# Patient Record
Sex: MALE | Race: WHITE | NOT HISPANIC OR LATINO | Employment: OTHER | URBAN - NONMETROPOLITAN AREA
[De-identification: names, ages, dates, MRNs, and addresses within clinical notes are randomized per-mention and may not be internally consistent; named-entity substitution may affect disease eponyms.]

---

## 2023-07-25 ENCOUNTER — HOSPITAL ENCOUNTER (OUTPATIENT)
Dept: GENERAL RADIOLOGY | Facility: HOSPITAL | Age: 68
Discharge: HOME | End: 2023-07-25
Admitting: NEUROLOGICAL SURGERY
Payer: MEDICARE

## 2023-07-25 ENCOUNTER — OFFICE VISIT (OUTPATIENT)
Dept: NEUROSURGERY | Facility: CLINIC | Age: 68
End: 2023-07-25
Payer: MEDICARE

## 2023-07-25 VITALS — HEIGHT: 70 IN | WEIGHT: 270 LBS | BODY MASS INDEX: 38.65 KG/M2 | TEMPERATURE: 99.3 F

## 2023-07-25 DIAGNOSIS — M43.16 SPONDYLOLISTHESIS OF LUMBAR REGION: ICD-10-CM

## 2023-07-25 DIAGNOSIS — M54.16 LUMBAR RADICULOPATHY: Primary | ICD-10-CM

## 2023-07-25 DIAGNOSIS — M51.36 DDD (DEGENERATIVE DISC DISEASE), LUMBAR: ICD-10-CM

## 2023-07-25 PROCEDURE — 1159F MED LIST DOCD IN RCRD: CPT | Performed by: NEUROLOGICAL SURGERY

## 2023-07-25 PROCEDURE — 72120 X-RAY BEND ONLY L-S SPINE: CPT

## 2023-07-25 PROCEDURE — 99203 OFFICE O/P NEW LOW 30 MIN: CPT | Performed by: NEUROLOGICAL SURGERY

## 2023-07-25 PROCEDURE — 1160F RVW MEDS BY RX/DR IN RCRD: CPT | Performed by: NEUROLOGICAL SURGERY

## 2023-07-25 RX ORDER — ERGOCALCIFEROL 1.25 MG/1
50000 CAPSULE ORAL
COMMUNITY
Start: 2023-06-22

## 2023-07-25 RX ORDER — METOPROLOL SUCCINATE 50 MG/1
1 TABLET, EXTENDED RELEASE ORAL DAILY
COMMUNITY
Start: 2023-06-22

## 2023-07-25 RX ORDER — PREGABALIN 50 MG/1
50 CAPSULE ORAL 2 TIMES DAILY
COMMUNITY
Start: 2023-07-12

## 2023-07-25 RX ORDER — HYDROCODONE BITARTRATE AND ACETAMINOPHEN 7.5; 325 MG/1; MG/1
1 TABLET ORAL EVERY 6 HOURS
COMMUNITY
Start: 2023-06-13

## 2023-07-25 RX ORDER — FAMOTIDINE 20 MG/1
1 TABLET, FILM COATED ORAL EVERY 12 HOURS SCHEDULED
COMMUNITY
Start: 2023-07-05

## 2023-07-25 NOTE — PROGRESS NOTES
Patient: Jose Aguirre  : 1955    Primary Care Provider: Aron Ochoa MD    Requesting Provider: As above        History    Chief Complaint: Right leg pain with sensory alteration.    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  He describes a 2-year history of low back pain.  A couple of months ago he developed horrible low back pain extending into the right leg after unloading a small utility trailer.  For a while he was debilitated and could not bear weight.  He was using crutches.  Over the last couple of weeks symptoms have improved.  He has taken Lyrica and Norco.  Symptoms extend into an approximately L4 distribution of his right lower extremity where he also had some tingling and numbness.  Symptoms have been worse with standing, walking, lifting.  He has done better sitting and leaning to the left.    Review of Systems   Constitutional:  Positive for fatigue. Negative for activity change, appetite change, chills, diaphoresis, fever and unexpected weight change.   HENT:  Positive for hearing loss. Negative for congestion, dental problem, drooling, ear discharge, ear pain, facial swelling, mouth sores, nosebleeds, postnasal drip, rhinorrhea, sinus pressure, sinus pain, sneezing, sore throat, tinnitus, trouble swallowing and voice change.    Eyes:  Positive for pain and discharge. Negative for photophobia, redness, itching and visual disturbance.   Respiratory:  Positive for apnea and shortness of breath. Negative for cough, choking, chest tightness, wheezing and stridor.    Cardiovascular:  Negative for chest pain, palpitations and leg swelling.   Gastrointestinal:  Negative for abdominal distention, abdominal pain, anal bleeding, blood in stool, constipation, diarrhea, nausea, rectal pain and vomiting.   Endocrine: Negative for cold intolerance, heat intolerance, polydipsia, polyphagia and polyuria.   Genitourinary:   "Positive for frequency. Negative for decreased urine volume, difficulty urinating, dyspareunia, dysuria, enuresis, flank pain, genital sores, hematuria, menstrual problem, pelvic pain, urgency, vaginal bleeding, vaginal discharge and vaginal pain.   Musculoskeletal:  Positive for back pain. Negative for arthralgias, gait problem, joint swelling, myalgias, neck pain and neck stiffness.   Skin:  Negative for color change, pallor, rash and wound.   Allergic/Immunologic: Negative for environmental allergies, food allergies and immunocompromised state.   Neurological:  Positive for headaches. Negative for dizziness, tremors, seizures, syncope, facial asymmetry, speech difficulty, weakness, light-headedness and numbness.   Hematological:  Negative for adenopathy. Bruises/bleeds easily.   Psychiatric/Behavioral:  Positive for agitation. Negative for behavioral problems, confusion, decreased concentration, dysphoric mood, hallucinations, self-injury, sleep disturbance and suicidal ideas. The patient is not nervous/anxious and is not hyperactive.      The patient's past medical history, past surgical history, family history, and social history have been reviewed at length in the electronic medical record.      Physical Exam:   Temp 99.3 °F (37.4 °C)   Ht 177.8 cm (70\")   Wt 122 kg (270 lb)   BMI 38.74 kg/m²   CONSTITUTIONAL: Patient is well-nourished, pleasant and appears stated age.  MUSCULOSKELETAL:  Straight leg raising is negative.  Ugo's Sign is negative.  ROM in the low back is normal.  Tenderness in the back to palpation is not observed.  NEUROLOGICAL:  Orientation, memory, attention span, language function, and cognition have been examined and are intact.  Strength is intact in the lower extremities to direct testing.  Muscle tone is normal throughout.  Station and gait are normal.  Sensation is intact to light touch testing throughout.  Deep tendon reflexes are 2+ and symmetrical except the right knee reflex " which is trace.  Coordination is intact.      Medical Decision Making    Data Review:   (All imaging studies were personally reviewed unless stated otherwise)  Lumbar MRI study dated 6/13/2023 demonstrates some degenerative disc disease and facet arthropathy.  There is a grade 1 listhesis of L4 on L5.  Large joint effusion is noted on the right at L4-5.  There is foraminal narrowing with some possible facet overgrowth or possibly even some degree of disc bulging.  Some of this is more lateral within the foramen.    Diagnosis:   1.  Right L4 radiculopathy.  2.  L4-5 spondylolisthesis.    Treatment Options:   Fortunately, the patient is improving.  He will continue his Lyrica.  I have referred him to physical therapy.  Prior to follow-up with me in several weeks I am going to check flexion-extension upright lateral lumbar spine x-rays to assess for instability.       Diagnosis Plan   1. Lumbar radiculopathy  Ambulatory Referral to Physical Therapy Evaluate and treat; Stretching, ROM, Strengthening      2. Spondylolisthesis of lumbar region  XR spine lumbar flex and ext      3. DDD (degenerative disc disease), lumbar            Scribed for Sameer Mares MD by NOE Castellon 7/25/2023 12:07 EDT      I, Dr. Mares, personally performed the services described in the documentation, as scribed in my presence, and it is both accurate and complete.

## 2023-09-12 ENCOUNTER — OFFICE VISIT (OUTPATIENT)
Dept: NEUROSURGERY | Facility: CLINIC | Age: 68
End: 2023-09-12
Payer: MEDICARE

## 2023-09-12 VITALS — TEMPERATURE: 97.7 F | WEIGHT: 270 LBS | HEIGHT: 70 IN | BODY MASS INDEX: 38.65 KG/M2 | RESPIRATION RATE: 17 BRPM

## 2023-09-12 DIAGNOSIS — M54.16 LUMBAR RADICULOPATHY: Primary | ICD-10-CM

## 2023-09-12 DIAGNOSIS — M43.16 SPONDYLOLISTHESIS OF LUMBAR REGION: ICD-10-CM

## 2023-09-12 PROBLEM — N17.9 AKI (ACUTE KIDNEY INJURY): Status: ACTIVE | Noted: 2022-07-13

## 2023-09-12 PROBLEM — I10 ESSENTIAL HYPERTENSION: Status: ACTIVE | Noted: 2022-07-13

## 2023-09-12 PROBLEM — E78.5 HLD (HYPERLIPIDEMIA): Status: ACTIVE | Noted: 2022-07-13

## 2023-09-12 PROBLEM — K21.9 GERD (GASTROESOPHAGEAL REFLUX DISEASE): Status: ACTIVE | Noted: 2022-07-13

## 2023-09-12 PROCEDURE — 99213 OFFICE O/P EST LOW 20 MIN: CPT | Performed by: NEUROLOGICAL SURGERY

## 2023-09-12 PROCEDURE — 1160F RVW MEDS BY RX/DR IN RCRD: CPT | Performed by: NEUROLOGICAL SURGERY

## 2023-09-12 PROCEDURE — 1159F MED LIST DOCD IN RCRD: CPT | Performed by: NEUROLOGICAL SURGERY

## 2023-09-12 RX ORDER — PREGABALIN 50 MG/1
50 CAPSULE ORAL DAILY
Qty: 30 CAPSULE | Refills: 0 | Status: SHIPPED | OUTPATIENT
Start: 2023-09-12

## 2023-09-12 NOTE — PROGRESS NOTES
Patient: Jose Aguirre  : 1955    Primary Care Provider: Aron Ochoa MD    Requesting Provider: As above        History    Chief Complaint: Right leg pain with sensory alteration.    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 He describes a 2-year history of low back pain. A couple of months ago he developed horrible low back pain extending into the right leg after unloading a small utility trailer. For a while he was debilitated and could not bear weight. He was using crutches. Over the last couple of weeks symptoms have improved. He has taken Lyrica.  When seen last he was doing better.  He has done therapy 2 times a week for several weeks.  He is not really having much in the way of leg pain at this point.  He has some low-grade intermittent low back pain.    Review of Systems   Constitutional:  Negative for activity change, appetite change, chills, diaphoresis, fatigue, fever and unexpected weight change.   HENT:  Negative for congestion, dental problem, drooling, ear discharge, ear pain, facial swelling, hearing loss, mouth sores, nosebleeds, postnasal drip, rhinorrhea, sinus pressure, sneezing, sore throat, tinnitus, trouble swallowing and voice change.    Eyes:  Negative for photophobia, pain, discharge, redness, itching and visual disturbance.   Respiratory:  Negative for apnea, cough, choking, chest tightness, shortness of breath, wheezing and stridor.    Cardiovascular:  Negative for chest pain, palpitations and leg swelling.   Gastrointestinal:  Negative for abdominal distention, abdominal pain, anal bleeding, blood in stool, constipation, diarrhea, nausea, rectal pain and vomiting.   Endocrine: Negative for cold intolerance, heat intolerance, polydipsia, polyphagia and polyuria.   Genitourinary:  Negative for decreased urine volume, difficulty urinating, dysuria, enuresis, flank pain, frequency, genital sores,  "hematuria and urgency.   Musculoskeletal:  Positive for arthralgias. Negative for back pain, gait problem, joint swelling, myalgias, neck pain and neck stiffness.   Skin:  Negative for color change, pallor, rash and wound.   Allergic/Immunologic: Negative for environmental allergies, food allergies and immunocompromised state.   Neurological:  Negative for dizziness, tremors, seizures, syncope, facial asymmetry, speech difficulty, weakness, light-headedness, numbness and headaches.   Hematological:  Negative for adenopathy. Does not bruise/bleed easily.   Psychiatric/Behavioral:  Negative for agitation, behavioral problems, confusion, decreased concentration, dysphoric mood, hallucinations, self-injury, sleep disturbance and suicidal ideas. The patient is not nervous/anxious and is not hyperactive.    All other systems reviewed and are negative.    The patient's past medical history, past surgical history, family history, and social history have been reviewed at length in the electronic medical record.      Physical Exam:   Temp 97.7 °F (36.5 °C) (Infrared)   Resp 17   Ht 177.8 cm (70\")   Wt 122 kg (270 lb)   BMI 38.74 kg/m²   The patient appears quite comfortable.  Straight leg raising is negative bilaterally.    Medical Decision Making    Data Review:   (All imaging studies were personally reviewed unless stated otherwise)  I have reviewed his new flexion-extension films and I think there is a little bit of instability at L4-5.  The radiologist did not see that but I would disagree with their interpretation.    Lumbar MRI study dated 6/13/2023 demonstrates some degenerative disc disease and facet arthropathy. There is a grade 1 listhesis of L4 on L5. Large joint effusion is noted on the right at L4-5. There is foraminal narrowing with some possible facet overgrowth or possibly even some degree of disc bulging. Some of this is more lateral within the foramen.     Diagnosis:   1.  Right L4 radiculopathy, " improved.  2.  L4-5 spondylolisthesis with mild instability.    Treatment Options:   The patient is doing better.  He is going to decrease his Lyrica to once a day.  If he does well with that then in a few weeks he will stop the Lyrica.  I have reordered his physical therapy 2 times a week for several more weeks.  He will follow-up in our clinic on an as-needed basis.       Diagnosis Plan   1. Lumbar radiculopathy        2. Spondylolisthesis of lumbar region            Scribed for Sameer Mares MD by Giuliana León CMA on 9/12/2023 09:30 EDT       I, Dr. Mares, personally performed the services described in the documentation, as scribed in my presence, and it is both accurate and complete.